# Patient Record
Sex: MALE | Race: WHITE | Employment: FULL TIME | URBAN - METROPOLITAN AREA
[De-identification: names, ages, dates, MRNs, and addresses within clinical notes are randomized per-mention and may not be internally consistent; named-entity substitution may affect disease eponyms.]

---

## 2017-01-13 ENCOUNTER — HOSPITAL ENCOUNTER (OUTPATIENT)
Dept: LAB | Age: 57
Discharge: HOME | End: 2017-01-13
Attending: INTERNAL MEDICINE
Payer: COMMERCIAL

## 2017-01-13 DIAGNOSIS — I25.10 CORONARY ARTERY DISEASE INVOLVING NATIVE HEART, ANGINA PRESENCE UNSPECIFIED, UNSPECIFIED VESSEL OR LESION TYPE: ICD-10-CM

## 2017-01-13 LAB
ALBUMIN SERPL BCP-MCNC: 3.8 G/DL (ref 3.5–5)
ALBUMIN/GLOB SERPL: 1.1 {RATIO}
ALP SERPL-CCNC: 54 U/L (ref 50–136)
ALT SERPL-CCNC: 43 U/L (ref 12–65)
AST SERPL W P-5'-P-CCNC: 31 U/L (ref 15–37)
BILIRUB DIRECT SERPL-MCNC: 0.1 MG/DL
BILIRUB SERPL-MCNC: 0.5 MG/DL (ref 0.2–1.1)
GLOBULIN SER CALC-MCNC: 3.4 G/DL
PROT SERPL-MCNC: 7.2 G/DL (ref 6.3–8.2)

## 2017-01-13 PROCEDURE — 36415 COLL VENOUS BLD VENIPUNCTURE: CPT | Performed by: INTERNAL MEDICINE

## 2017-01-13 PROCEDURE — 80076 HEPATIC FUNCTION PANEL: CPT | Performed by: INTERNAL MEDICINE

## 2022-06-07 DIAGNOSIS — I10 ESSENTIAL (PRIMARY) HYPERTENSION: ICD-10-CM

## 2022-06-07 DIAGNOSIS — N40.1 BENIGN PROSTATIC HYPERPLASIA WITH LOWER URINARY TRACT SYMPTOMS: ICD-10-CM

## 2022-06-07 RX ORDER — TAMSULOSIN HYDROCHLORIDE 0.4 MG/1
CAPSULE ORAL
Qty: 90 CAPSULE | Refills: 0 | Status: SHIPPED | OUTPATIENT
Start: 2022-06-07 | End: 2022-08-16 | Stop reason: SDUPTHER

## 2022-06-07 RX ORDER — LISINOPRIL 10 MG/1
TABLET ORAL
Qty: 90 TABLET | Refills: 0 | Status: SHIPPED | OUTPATIENT
Start: 2022-06-07 | End: 2022-08-16 | Stop reason: SDUPTHER

## 2022-08-09 ENCOUNTER — TELEPHONE (OUTPATIENT)
Dept: CARDIOLOGY CLINIC | Age: 62
End: 2022-08-09

## 2022-08-09 NOTE — TELEPHONE ENCOUNTER
----- Message from Ludwig Abraham sent at 8/9/2022  3:19 PM EDT -----  Regarding: FW: Please schedule yearly in September Jean Pierre Barnes    ----- Message -----  From: Juan Palacios RN  Sent: 8/9/2022  12:00 AM EDT  To: , #  Subject: Please schedule yearly in September - Phyllis    Please schedule yearly in September- Lila Calhoun

## 2022-08-16 ENCOUNTER — OFFICE VISIT (OUTPATIENT)
Dept: FAMILY MEDICINE CLINIC | Facility: CLINIC | Age: 62
End: 2022-08-16
Payer: COMMERCIAL

## 2022-08-16 VITALS
TEMPERATURE: 97.9 F | HEART RATE: 89 BPM | HEIGHT: 69 IN | BODY MASS INDEX: 23.31 KG/M2 | SYSTOLIC BLOOD PRESSURE: 142 MMHG | DIASTOLIC BLOOD PRESSURE: 80 MMHG | OXYGEN SATURATION: 96 % | WEIGHT: 157.4 LBS

## 2022-08-16 DIAGNOSIS — K21.9 GASTROESOPHAGEAL REFLUX DISEASE WITHOUT ESOPHAGITIS: ICD-10-CM

## 2022-08-16 DIAGNOSIS — Z12.11 COLON CANCER SCREENING: ICD-10-CM

## 2022-08-16 DIAGNOSIS — R35.1 BENIGN PROSTATIC HYPERPLASIA WITH NOCTURIA: ICD-10-CM

## 2022-08-16 DIAGNOSIS — N40.1 BENIGN PROSTATIC HYPERPLASIA WITH NOCTURIA: ICD-10-CM

## 2022-08-16 DIAGNOSIS — E78.5 HYPERLIPIDEMIA, UNSPECIFIED HYPERLIPIDEMIA TYPE: ICD-10-CM

## 2022-08-16 DIAGNOSIS — I10 ESSENTIAL (PRIMARY) HYPERTENSION: Primary | ICD-10-CM

## 2022-08-16 DIAGNOSIS — Z72.0 TOBACCO ABUSE: ICD-10-CM

## 2022-08-16 DIAGNOSIS — I10 ESSENTIAL (PRIMARY) HYPERTENSION: ICD-10-CM

## 2022-08-16 DIAGNOSIS — I25.10 CORONARY ARTERY DISEASE INVOLVING NATIVE HEART, UNSPECIFIED VESSEL OR LESION TYPE, UNSPECIFIED WHETHER ANGINA PRESENT: ICD-10-CM

## 2022-08-16 LAB
ANION GAP SERPL CALC-SCNC: ABNORMAL MMOL/L (ref 7–16)
BUN SERPL-MCNC: 9 MG/DL (ref 8–23)
CALCIUM SERPL-MCNC: 8.8 MG/DL (ref 8.3–10.4)
CHLORIDE SERPL-SCNC: 109 MMOL/L (ref 98–107)
CO2 SERPL-SCNC: 29 MMOL/L (ref 21–32)
CREAT SERPL-MCNC: 0.9 MG/DL (ref 0.8–1.5)
GLUCOSE SERPL-MCNC: 123 MG/DL (ref 65–100)
POTASSIUM SERPL-SCNC: 4.1 MMOL/L (ref 3.5–5.1)
PSA SERPL-MCNC: 0.4 NG/ML
SODIUM SERPL-SCNC: 135 MMOL/L (ref 136–145)

## 2022-08-16 PROCEDURE — 99215 OFFICE O/P EST HI 40 MIN: CPT | Performed by: STUDENT IN AN ORGANIZED HEALTH CARE EDUCATION/TRAINING PROGRAM

## 2022-08-16 RX ORDER — SIMVASTATIN 20 MG
20 TABLET ORAL NIGHTLY
Qty: 90 TABLET | Refills: 3 | Status: SHIPPED | OUTPATIENT
Start: 2022-08-16 | End: 2022-08-24 | Stop reason: SDUPTHER

## 2022-08-16 RX ORDER — TAMSULOSIN HYDROCHLORIDE 0.4 MG/1
CAPSULE ORAL
Qty: 90 CAPSULE | Refills: 3 | Status: SHIPPED | OUTPATIENT
Start: 2022-08-16

## 2022-08-16 RX ORDER — LISINOPRIL 10 MG/1
TABLET ORAL
Qty: 90 TABLET | Refills: 3 | Status: SHIPPED | OUTPATIENT
Start: 2022-08-16 | End: 2022-08-24 | Stop reason: SDUPTHER

## 2022-08-16 RX ORDER — OMEPRAZOLE 40 MG/1
40 CAPSULE, DELAYED RELEASE ORAL DAILY
Qty: 90 CAPSULE | Refills: 3 | Status: SHIPPED | OUTPATIENT
Start: 2022-08-16

## 2022-08-16 SDOH — ECONOMIC STABILITY: FOOD INSECURITY: WITHIN THE PAST 12 MONTHS, THE FOOD YOU BOUGHT JUST DIDN'T LAST AND YOU DIDN'T HAVE MONEY TO GET MORE.: NEVER TRUE

## 2022-08-16 SDOH — ECONOMIC STABILITY: FOOD INSECURITY: WITHIN THE PAST 12 MONTHS, YOU WORRIED THAT YOUR FOOD WOULD RUN OUT BEFORE YOU GOT MONEY TO BUY MORE.: NEVER TRUE

## 2022-08-16 ASSESSMENT — PATIENT HEALTH QUESTIONNAIRE - PHQ9
SUM OF ALL RESPONSES TO PHQ QUESTIONS 1-9: 0
1. LITTLE INTEREST OR PLEASURE IN DOING THINGS: 0
SUM OF ALL RESPONSES TO PHQ QUESTIONS 1-9: 0
2. FEELING DOWN, DEPRESSED OR HOPELESS: 0
SUM OF ALL RESPONSES TO PHQ QUESTIONS 1-9: 0
SUM OF ALL RESPONSES TO PHQ9 QUESTIONS 1 & 2: 0
SUM OF ALL RESPONSES TO PHQ QUESTIONS 1-9: 0

## 2022-08-16 ASSESSMENT — ANXIETY QUESTIONNAIRES
2. NOT BEING ABLE TO STOP OR CONTROL WORRYING: 0
1. FEELING NERVOUS, ANXIOUS, OR ON EDGE: 0
5. BEING SO RESTLESS THAT IT IS HARD TO SIT STILL: 0
7. FEELING AFRAID AS IF SOMETHING AWFUL MIGHT HAPPEN: 0
3. WORRYING TOO MUCH ABOUT DIFFERENT THINGS: 0
GAD7 TOTAL SCORE: 0
6. BECOMING EASILY ANNOYED OR IRRITABLE: 0
4. TROUBLE RELAXING: 0

## 2022-08-16 ASSESSMENT — SOCIAL DETERMINANTS OF HEALTH (SDOH): HOW HARD IS IT FOR YOU TO PAY FOR THE VERY BASICS LIKE FOOD, HOUSING, MEDICAL CARE, AND HEATING?: NOT HARD AT ALL

## 2022-08-16 NOTE — PROGRESS NOTES
[facility]  Farzana Perez  Phone [phone]  Fax:  [phone]    Patient: Goergia Lala  YOB: 1960  Patient Age 64 y.o. Patient sex: male  Medical Record:  [id number]  Visit Date: 08/16/22    [facility] Note     Chief Complaint   Patient presents with    Follow-up       History of Present Illness:  71-year-old white male history of hypertension, GERD, BPH presents for routine follow-up. This the first time I am seeing this patient. Denies any chest pain, shortness of breath, fever, chills, any other symptoms at this time. Still endorsing some abdominal pain after meals. Continuing to take omeprazole. Discussed cutting back soda intake. Patient just got laid off from work and will lose his insurance at the end of this month. Has never had a colonoscopy but is interested in Cologuard. We will order today. Coronary disease-stable. Following with cardiology yearly, appointment 1 week. On statin and aspirin. Tobacco use-trying to quit.  stopped Wellbutrin. Vape daily. Quit smoking 10 years 40 years 1ppd    Hypertension-stable. BMP today. Patient drinks 5 bottles of 6 ounce Dr. Ana Beckwith per day. Discussed cutting back caffeine intake. BPH-stable. Feeling of Incomplete emptying when voiding. Wakes 2 twice/night to urinate. Was taking 2 pills per day said it worked better. On Flomax. PSA today.         Allergies:No Known Allergies    Current Medications:   Medications marked \"taking\" at this time:    Current Outpatient Medications:     simvastatin (ZOCOR) 20 MG tablet, Take 1 tablet by mouth nightly Take 1 tablet po daily, Disp: 90 tablet, Rfl: 3    omeprazole (PRILOSEC) 40 MG delayed release capsule, Take 1 capsule by mouth in the morning., Disp: 90 capsule, Rfl: 3    lisinopril (PRINIVIL;ZESTRIL) 10 MG tablet, TAKE 1 TABLET BY MOUTH EVERY DAY, Disp: 90 tablet, Rfl: 3    tamsulosin (FLOMAX) 0.4 MG capsule, TAKE 1 CAPSULE BY MOUTH EVERY DAY, Disp: 90 capsule, Rfl: 3    loratadine (CLARITIN) 10 MG tablet, Take 10 mg by mouth, Disp: , Rfl:     nitroGLYCERIN (NITROSTAT) 0.4 MG SL tablet, Place 0.4 mg under the tongue, Disp: , Rfl:     buPROPion (WELLBUTRIN SR) 150 MG extended release tablet, Take 150 mg by mouth 2 times daily (Patient not taking: Reported on 8/16/2022), Disp: , Rfl:     fluticasone (FLONASE) 50 MCG/ACT nasal spray, USE 2 SPRAYS INTO EACH NOSTRIL EVERY DAY (Patient not taking: Reported on 8/16/2022), Disp: , Rfl:     Current Problem List:   Patient Active Problem List   Diagnosis    Rhinitis medicamentosa    Chronic maxillary sinusitis    Benign prostatic hyperplasia with lower urinary tract symptoms    Gastroesophageal reflux disease without esophagitis    Tobacco abuse       Social History:   Social History     Tobacco Use    Smoking status: Former    Smokeless tobacco: Former    Tobacco comments:     Quit smoking: E-Cigarettes (on and off a day)   Substance Use Topics    Alcohol use: No       Family History:   Family History   Problem Relation Age of Onset    Heart Disease Mother     Cancer Father     Stroke Father     Stroke Mother        Surgical History:  Past Surgical History:   Procedure Laterality Date    SEPTOPLASTY  2010       ROS  Review of Systems    Visit Vitals  BP (!) 142/80   Pulse 89   Temp 97.9 °F (36.6 °C)   Ht 5' 9\" (1.753 m)   Wt 157 lb 6.4 oz (71.4 kg)   SpO2 96%   BMI 23.24 kg/m²       Physical Exam  Physical Exam      ASSESSMENT & PLAN  Mariano Bhardwaj was seen today for follow-up. Diagnoses and all orders for this visit:    Essential (primary) hypertension  -     lisinopril (PRINIVIL;ZESTRIL) 10 MG tablet; TAKE 1 TABLET BY MOUTH EVERY DAY  -     Basic Metabolic Panel; Future  -Stable. /80 today. BMP today. Continue medication. Gastroesophageal reflux disease without esophagitis  -     omeprazole (PRILOSEC) 40 MG delayed release capsule;  Take 1 capsule by mouth in the morning.  - Still having abdominal pain after meals. Continue medication. Discussed cutting back soda intake. Benign prostatic hyperplasia with nocturia  -     PSA, Diagnostic; Future  - Still having feelings of incomplete emptying when voiding. Wakes up twice per night to urinate. Checking PSA today. Coronary artery disease involving native heart, unspecified vessel or lesion type, unspecified whether angina present  -Following with cardiology yearly, appointment 1 week. On statin and aspirin. Hyperlipidemia, unspecified hyperlipidemia type  -     simvastatin (ZOCOR) 20 MG tablet; Take 1 tablet by mouth nightly Take 1 tablet po daily    Benign prostatic hyperplasia with lower urinary tract symptoms  -     tamsulosin (FLOMAX) 0.4 MG capsule; TAKE 1 CAPSULE BY MOUTH EVERY DAY    Tobacco abuse  - Smoked 1 pack/day for 40 years. Quit smoking 10 years ago continues to vape daily. Quit taking Wellbutrin that was prescribed by cardiology to help decrease smoking. Patient not interested in nicotine patches or gum at this time. Colon cancer screening  -     Cologuard (Fecal DNA Colorectal Cancer Screening)      I have reviewed the patient's past medical history, social history and family history and vitals. We have discussed treatment plan and follow up and given patient instructions. Patient's questions are answered and we will follow up as indicated. On this date 8/16/2022 I have spent 40 minutes reviewing previous notes, test results and face to face with the patient discussing the diagnosis and importance of compliance with the treatment plan as well as documenting on the day of the visit.     Betsey Koo, DO

## 2022-08-24 ENCOUNTER — OFFICE VISIT (OUTPATIENT)
Dept: CARDIOLOGY CLINIC | Age: 62
End: 2022-08-24
Payer: COMMERCIAL

## 2022-08-24 VITALS
DIASTOLIC BLOOD PRESSURE: 78 MMHG | BODY MASS INDEX: 23.09 KG/M2 | WEIGHT: 155.9 LBS | HEIGHT: 69 IN | HEART RATE: 68 BPM | SYSTOLIC BLOOD PRESSURE: 132 MMHG

## 2022-08-24 DIAGNOSIS — I10 ESSENTIAL (PRIMARY) HYPERTENSION: Primary | ICD-10-CM

## 2022-08-24 DIAGNOSIS — I44.7 LEFT BUNDLE-BRANCH BLOCK, UNSPECIFIED: ICD-10-CM

## 2022-08-24 DIAGNOSIS — E78.5 HYPERLIPIDEMIA, UNSPECIFIED HYPERLIPIDEMIA TYPE: ICD-10-CM

## 2022-08-24 PROCEDURE — 99214 OFFICE O/P EST MOD 30 MIN: CPT | Performed by: INTERNAL MEDICINE

## 2022-08-24 PROCEDURE — 93000 ELECTROCARDIOGRAM COMPLETE: CPT | Performed by: INTERNAL MEDICINE

## 2022-08-24 RX ORDER — NITROGLYCERIN 0.4 MG/1
0.4 TABLET SUBLINGUAL EVERY 5 MIN PRN
Qty: 25 TABLET | Refills: 1 | Status: SHIPPED | OUTPATIENT
Start: 2022-08-24

## 2022-08-24 RX ORDER — LISINOPRIL 10 MG/1
TABLET ORAL
Qty: 90 TABLET | Refills: 3 | Status: SHIPPED | OUTPATIENT
Start: 2022-08-24

## 2022-08-24 RX ORDER — SIMVASTATIN 20 MG
20 TABLET ORAL NIGHTLY
Qty: 90 TABLET | Refills: 3 | Status: SHIPPED | OUTPATIENT
Start: 2022-08-24

## 2022-08-24 ASSESSMENT — ENCOUNTER SYMPTOMS
EYE PAIN: 0
STRIDOR: 0
ABDOMINAL PAIN: 0
COUGH: 0
NAIL CHANGES: 0
APHONIA: 0

## 2022-08-24 NOTE — PROGRESS NOTES
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 male presented for follow-up 8/24/22 Lost leonor recently. Interval Hx:   No symptoms of shortness of breath, chest discomfort over the past year. He continues to smoke E cigarettes. He works in construction, taking part in rigorous physical activity with no symptoms. He is tolerating statin therapy well. He denies lower  extremity pain, myalgias. Cardiac History:      Reported normal nuclear stress test 2015 2016 [address]     Cardiac CT 2016 with mild nonobstructive disease in the circumflex artery, LAD angiographically normal, right coronary angiographically normal.     EKG LBBB 2016    Simvastatin initiated 2016    chest discomfort. The patient had symptoms of chest discomfort located in his right chest. These are atypical.     2016 LBBB EKG     2019 sinus rhythm LBBB    8/7/2020 EKG Sinus  Bradycardia Nonspecific QRS widening. Negative T-waves  -May be normal -possible  Anteroseptal/anterior ischemia. 9/17/2021 sinus rhythm, normal rate, normal [place] intervals, ST wave normal inversion          Assessment and Plan:     Coronary disease. Noted on cardiac CT scan. The patient is on statin therapy with aspirin. Nicotine abuse. Nicotine cessation discussed. .     welbutrin    Hyperlipidemia.  simvastatin - 20 MG       Hypertension, controlled. Continue current therapies. Key CAD CHF Meds            lisinopril (PRINIVIL;ZESTRIL) 10 MG tablet (Taking)    Sig: TAKE 1 TABLET BY MOUTH EVERY DAY    simvastatin (ZOCOR) 20 MG tablet (Taking)    Sig - Route:  Take 1 tablet by mouth nightly Take 1 tablet po daily - Oral             Left bundle branch block no symptoms excellent functional capacity    Minimal coronary disease           Current Outpatient Medications   Medication Sig    lisinopril (PRINIVIL;ZESTRIL) 10 MG tablet TAKE 1 TABLET BY MOUTH EVERY DAY    simvastatin (ZOCOR) 20 MG tablet Take 1 tablet by mouth nightly Take 1 tablet po daily    nitroGLYCERIN (NITROSTAT) 0.4 MG SL tablet Place 1 tablet under the tongue every 5 minutes as needed for Chest pain    omeprazole (PRILOSEC) 40 MG delayed release capsule Take 1 capsule by mouth in the morning. tamsulosin (FLOMAX) 0.4 MG capsule TAKE 1 CAPSULE BY MOUTH EVERY DAY    fluticasone (FLONASE) 50 MCG/ACT nasal spray as needed USE 2 SPRAYS INTO EACH NOSTRIL EVERY DAY    loratadine (CLARITIN) 10 MG tablet Take 10 mg by mouth     No current facility-administered medications for this visit. Past Medical History, Past Surgical History, Family history, Social History, and Medications were all reviewed with the patient today and updated as necessary. No Known Allergies  Past Medical History:   Diagnosis Date    Benign prostatic hyperplasia with lower urinary tract symptoms 8/26/2016    CAD (coronary artery disease)     Chest pain 9/30/2016    Chronic maxillary sinusitis 8/26/2016    Essential hypertension with goal blood pressure less than 140/90 8/26/2016    Gastroesophageal reflux disease without esophagitis 8/26/2016    Hypercholesterolemia     Rhinitis medicamentosa     Sinus problem     Tobacco abuse 8/26/2016     Past Surgical History:   Procedure Laterality Date    SEPTOPLASTY  2010     Family History   Problem Relation Age of Onset    Heart Disease Mother     Cancer Father     Stroke Father     Stroke Mother       Social History     Tobacco Use    Smoking status: Former    Smokeless tobacco: Former    Tobacco comments:     Quit smoking: E-Cigarettes (on and off a day)   Substance Use Topics    Alcohol use: No       ROS:    Review of Systems   Constitutional: Negative for fever. HENT:  Negative for stridor. Eyes:  Negative for pain. Cardiovascular:  Negative for chest pain. Respiratory:  Negative for cough. Endocrine: Negative for cold intolerance. Skin:  Negative for nail changes. Musculoskeletal:  Negative for arthritis. Gastrointestinal:  Negative for abdominal pain. Genitourinary:  Negative for dysuria. Neurological:  Negative for aphonia. Psychiatric/Behavioral:  Negative for altered mental status. Allergic/Immunologic: Negative for hives. PHYSICAL EXAM:    /78   Pulse 68   Ht 5' 9\" (1.753 m)   Wt 155 lb 14.4 oz (70.7 kg)   BMI 23.02 kg/m²        Wt Readings from Last 3 Encounters:   08/24/22 155 lb 14.4 oz (70.7 kg)   08/16/22 157 lb 6.4 oz (71.4 kg)   09/17/21 153 lb (69.4 kg)     BP Readings from Last 3 Encounters:   08/24/22 132/78   08/16/22 (!) 142/80   09/17/21 124/70         Physical Exam  Vitals reviewed. HENT:      Head: Normocephalic. Right Ear: External ear normal.      Left Ear: External ear normal.      Nose: Nose normal.   Eyes:      General: No scleral icterus. Pulmonary:      Effort: Pulmonary effort is normal.   Abdominal:      General: There is no distension. Musculoskeletal:      Cervical back: Neck supple. Skin:     General: Skin is warm. Neurological:      Mental Status: He is alert. Mental status is at baseline. Medical problems and test results were reviewed with the patient today.            Recent Results (from the past 672 hour(s))   Basic Metabolic Panel    Collection Time: 08/16/22 11:06 AM   Result Value Ref Range    Sodium 135 (L) 136 - 145 mmol/L    Potassium 4.1 3.5 - 5.1 mmol/L    Chloride 109 (H) 98 - 107 mmol/L    CO2 29 21 - 32 mmol/L    Anion Gap Cannot be calculated 7 - 16 mmol/L    Glucose 123 (H) 65 - 100 mg/dL    BUN 9 8 - 23 MG/DL    Creatinine 0.90 0.8 - 1.5 MG/DL    GFR African American >60 >60 ml/min/1.73m2    GFR Non- >60 >60 ml/min/1.73m2    Calcium 8.8 8.3 - 10.4 MG/DL   PSA, Diagnostic    Collection Time: 08/16/22 11:06 AM   Result Value Ref Range    PSA 0.4 <4.0 ng/mL     Lab Results   Component Value Date/Time    CHOL 150 07/06/2021 09:01 AM    HDL 37 07/06/2021 09:01 AM    VLDL 31 07/06/2021 09:01 AM     Results for orders placed or performed in visit on 08/24/22   EKG 12 Lead    Impression    Sinus  Rhythm   -Left bundle branch block and left axis. Roselia Lockett was seen today for hypertension. Diagnoses and all orders for this visit:    Essential (primary) hypertension  -     EKG 12 Lead  -     lisinopril (PRINIVIL;ZESTRIL) 10 MG tablet; TAKE 1 TABLET BY MOUTH EVERY DAY    Hyperlipidemia, unspecified hyperlipidemia type  -     simvastatin (ZOCOR) 20 MG tablet; Take 1 tablet by mouth nightly Take 1 tablet po daily    Left bundle-branch block, unspecified    Other orders  -     nitroGLYCERIN (NITROSTAT) 0.4 MG SL tablet; Place 1 tablet under the tongue every 5 minutes as needed for Chest pain    Return in about 1 year (around 8/24/2023).        Fransico Ramsay MD  8/24/2022  9:02 AM